# Patient Record
Sex: MALE
[De-identification: names, ages, dates, MRNs, and addresses within clinical notes are randomized per-mention and may not be internally consistent; named-entity substitution may affect disease eponyms.]

---

## 2019-03-05 ENCOUNTER — HOSPITAL ENCOUNTER (OUTPATIENT)
Dept: HOSPITAL 90 - RAH | Age: 79
Discharge: HOME | End: 2019-03-05
Attending: FAMILY MEDICINE
Payer: COMMERCIAL

## 2019-03-05 DIAGNOSIS — R05: Primary | ICD-10-CM
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
## 2019-03-05 DIAGNOSIS — T17.308A: ICD-10-CM

## 2019-03-05 DIAGNOSIS — R13.10: ICD-10-CM
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
## 2019-03-05 PROCEDURE — 74230 X-RAY XM SWLNG FUNCJ C+: CPT

## 2019-03-05 PROCEDURE — 92611 MOTION FLUOROSCOPY/SWALLOW: CPT

## 2019-03-05 NOTE — NUR
MBSS COMPLETED. +PENETRATION WITH THIN LIQUIDS VIA CONTINUOUS CUP SIP. RECOMMEND REGULAR 
TEXTURE, THIN LIQUIDS; PILLS WHOLE WITH LIQUIDS. 



PATIENT INFORMATION: 

Pt IS AN 79 YEAR OLD MALE REFERRED FOR A REPEAT MBSS SECONDARY TO COMPLAINS OF CHOKING AT 
MEAL TIMES. Pt REPORTS THAT HE FEELS FOOD GET STUCK POINTING TO LEVEL OF ESOPHAGUS AND HE IS 
NOT ABLE TO SWALLOW EVEN WATER. Pt HAS A PAST MEDICAL HISTORY SIGNIFICANT OF CORONARY ARTERY 
DISEASE AND WOUND. 



MBSS INTERPRETATION: 

Pt PRESENT WITH MILD PHARYNGEAL DYSPHAGIA CAUSED BY MILDLY DECREASED TONGUE BASE RETRACTION, 
DELAYED PHARYNGEAL RESPONSE TRIGGER AND MILDLY DECREASED LARYNGEAL ADDUCTION RESULTING IN 
SHALLOW PENETRATION WITH THIN LIQUIDS VIA CONTINUOUS CUP SIP WITH NO COUGH RESPONSE. NO 
PENETRATION WITH THIN LIQUIDS VIA CUP SIP WITH ONE SIP AT A TIME. A-P VIEW COMPLETED. 



TRIALS: 

1. TSP PUREED: GOOD

2. TSP PUDDING: GOOD

3. MIXED TEXTURE: GOOD

4. COOKIE: GOOD

5. THIN LIQUIDS VIA CUP SIP CONTINUOUS: SHALLOW PENETRATION

6. THIN LIQUIDS VIA CUP SIP VOLUME CONTROL: GOOD



7. A-P PUDDING OBSERVATIONS: BOLUS RESIDUE NOTED IN UPPER 1/3 OF ESOPHAGUS (CLEARED WITH 
RE-SWALLOW), BOLUS TRANSIT WAS SLOW, DID NOT EXCEED MORE THAN 15 SECONDS. 



RECOMMENDATIONS: 

1. REGULAR TEXTURE, THIN LIQUIDS; PILLS WHOLE WITH LIQUIDS. 

2. COMPENSATORY STRATEGIES: 

     *SEATED AT 90 DEGREES

     *NO STRAW

     *ONE SIP AT A TIME

     *SMALL BITES AND SIPS

     *SLOW RATE

3. GI CONSULT



EDUCATIONS: 

SLP EDUCATED Pt ON RISKS AND CONSEQUENCES OF ASPIRATION. Pt WAS PROVIDED WITH WRITTEN 
HANDOUTS OF RECOMMENDATIONS AND RESULTS. Pt AND DAUGHTER VERBALIZED AGREEMENT AND COMPLIANCE 
WITH RECOMMENDATIONS. 



G-CODES SWALLOWING: 

-JF

-FJ

-UO

-------------------------------------------------------------------------------

Addendum: 03/05/19 at 1231 by [address]ON [address]

-------------------------------------------------------------------------------

Amended: Links added.

## 2019-03-14 ENCOUNTER — HOSPITAL ENCOUNTER (OUTPATIENT)
Dept: HOSPITAL 90 - RAH | Age: 79
Discharge: HOME | End: 2019-03-14
Attending: FAMILY MEDICINE
Payer: COMMERCIAL

## 2019-03-14 DIAGNOSIS — R41.3: ICD-10-CM

## 2019-03-14 DIAGNOSIS — R29.898: ICD-10-CM

## 2019-03-14 DIAGNOSIS — G31.9: Primary | ICD-10-CM

## 2019-03-14 PROCEDURE — 70553 MRI BRAIN STEM W/O & W/DYE: CPT
